# Patient Record
Sex: FEMALE | Race: BLACK OR AFRICAN AMERICAN | NOT HISPANIC OR LATINO
[De-identification: names, ages, dates, MRNs, and addresses within clinical notes are randomized per-mention and may not be internally consistent; named-entity substitution may affect disease eponyms.]

---

## 2019-04-11 ENCOUNTER — TRANSCRIPTION ENCOUNTER (OUTPATIENT)
Age: 69
End: 2019-04-11

## 2020-02-14 ENCOUNTER — TRANSCRIPTION ENCOUNTER (OUTPATIENT)
Age: 70
End: 2020-02-14

## 2020-10-27 ENCOUNTER — TRANSCRIPTION ENCOUNTER (OUTPATIENT)
Age: 70
End: 2020-10-27

## 2021-04-07 ENCOUNTER — APPOINTMENT (OUTPATIENT)
Dept: DISASTER EMERGENCY | Facility: OTHER | Age: 71
End: 2021-04-07

## 2021-12-22 ENCOUNTER — TRANSCRIPTION ENCOUNTER (OUTPATIENT)
Age: 71
End: 2021-12-22

## 2022-07-09 ENCOUNTER — NON-APPOINTMENT (OUTPATIENT)
Age: 72
End: 2022-07-09

## 2022-09-08 ENCOUNTER — APPOINTMENT (OUTPATIENT)
Dept: MRI IMAGING | Facility: CLINIC | Age: 72
End: 2022-09-08

## 2022-09-08 ENCOUNTER — RESULT REVIEW (OUTPATIENT)
Age: 72
End: 2022-09-08

## 2022-09-08 ENCOUNTER — APPOINTMENT (OUTPATIENT)
Dept: ORTHOPEDIC SURGERY | Facility: CLINIC | Age: 72
End: 2022-09-08

## 2022-09-08 VITALS — WEIGHT: 190 LBS | BODY MASS INDEX: 32.44 KG/M2 | HEIGHT: 64 IN

## 2022-09-08 DIAGNOSIS — I10 ESSENTIAL (PRIMARY) HYPERTENSION: ICD-10-CM

## 2022-09-08 DIAGNOSIS — G62.9 POLYNEUROPATHY, UNSPECIFIED: ICD-10-CM

## 2022-09-08 DIAGNOSIS — E11.9 TYPE 2 DIABETES MELLITUS W/OUT COMPLICATIONS: ICD-10-CM

## 2022-09-08 DIAGNOSIS — Z87.891 PERSONAL HISTORY OF NICOTINE DEPENDENCE: ICD-10-CM

## 2022-09-08 DIAGNOSIS — Z86.39 PERSONAL HISTORY OF OTHER ENDOCRINE, NUTRITIONAL AND METABOLIC DISEASE: ICD-10-CM

## 2022-09-08 PROBLEM — Z00.00 ENCOUNTER FOR PREVENTIVE HEALTH EXAMINATION: Status: ACTIVE | Noted: 2022-09-08

## 2022-09-08 PROCEDURE — 99203 OFFICE O/P NEW LOW 30 MIN: CPT | Mod: 57

## 2022-09-08 PROCEDURE — 73030 X-RAY EXAM OF SHOULDER: CPT | Mod: LT

## 2022-09-08 PROCEDURE — 73564 X-RAY EXAM KNEE 4 OR MORE: CPT | Mod: LT

## 2022-09-08 NOTE — ASSESSMENT
[FreeTextEntry1] : Underlying pathology reviewed and treatment options discussed. \par 09/08/2022 : xrays reveal 4 views left knee reveals suggestion of  mildly displace medial tibial plateua with underlying osteoarthritis. \par Obtain MRI left knee eval medial tibial plateau for fx\par Activity modifier as tolerated.\par Will attempt to dispense brace, is optional, resume cane for stability. \par Questions addressed.\par \par

## 2022-09-08 NOTE — REASON FOR VISIT
[FreeTextEntry2] : 09/08/2022 This is a  71 year female present for left knee and left arm , she fell @ [employer] 9/7/22\par

## 2022-09-08 NOTE — HISTORY OF PRESENT ILLNESS
[Sudden] : sudden [5] : 5 [2] : 2 [Burning] : burning [Dull/Aching] : dull/aching [Throbbing] : throbbing [Occasional] : occasional [Standing] : standing [Walking] : walking [Exercising] : exercising [Stairs] : stairs [de-identified] : 9/8/22: This is a 70 YO [place]. c/o of ecchymosis, hx of neuropathy. Also c/o of left shoulder pain.  [] : no [FreeTextEntry3] : 9/7/22

## 2022-09-08 NOTE — PHYSICAL EXAM
[Left] : left knee [] : patient ambulates with assistive device [TWNoteComboBox7] : flexion 120 degrees [de-identified] : extension 5 degrees

## 2022-09-08 NOTE — DISCUSSION/SUMMARY
[de-identified] : The documentation recorded by the scribe accurately reflects the service I personally performed and the decisions made by me.\par I, David Castro, attest that this documentation has been prepared under the direction and in the presence of Provider Samuel Paul MD.\par \par The patient was seen by Samuel Paul MD.\par The following radiographs were ordered and read by me during this patient's visit. I reviewed each radiograph in detail with the patient and parent, and discussed the findings as highlighted below.\par

## 2022-09-09 ENCOUNTER — FORM ENCOUNTER (OUTPATIENT)
Age: 72
End: 2022-09-09

## 2022-09-09 ENCOUNTER — TRANSCRIPTION ENCOUNTER (OUTPATIENT)
Age: 72
End: 2022-09-09

## 2022-09-10 ENCOUNTER — APPOINTMENT (OUTPATIENT)
Dept: MRI IMAGING | Facility: CLINIC | Age: 72
End: 2022-09-10

## 2022-09-10 PROCEDURE — 73721 MRI JNT OF LWR EXTRE W/O DYE: CPT | Mod: LT,MH

## 2022-09-15 ENCOUNTER — APPOINTMENT (OUTPATIENT)
Dept: ORTHOPEDIC SURGERY | Facility: CLINIC | Age: 72
End: 2022-09-15

## 2022-09-15 VITALS — WEIGHT: 190 LBS | HEIGHT: 64 IN | BODY MASS INDEX: 32.44 KG/M2

## 2022-09-15 DIAGNOSIS — S83.232A COMPLEX TEAR OF MEDIAL MENISCUS, CURRENT INJURY, LEFT KNEE, INITIAL ENCOUNTER: ICD-10-CM

## 2022-09-15 DIAGNOSIS — S82.132A DISPLACED FRACTURE OF MEDIAL CONDYLE OF LEFT TIBIA, INITIAL ENCOUNTER FOR CLOSED FRACTURE: ICD-10-CM

## 2022-09-15 PROCEDURE — 99213 OFFICE O/P EST LOW 20 MIN: CPT

## 2022-09-15 NOTE — ASSESSMENT
[FreeTextEntry1] : Underlying pathology reviewed and treatment options discussed. \par 09/08/2022 : xrays reveal 4 views left knee reveals suggestion of  mildly displace medial tibial plateua with underlying osteoarthritis. \par Obtain MRI left knee eval medial tibial plateau for fx\par Activity modifier as tolerated.\par Will attempt to dispense brace, is optional, resume cane for stability. \par Questions addressed.\par \par Stat doppler of the left lower extremity r/o DVT due to swelling, and severe tenderness.\par 09/15/2022: MRI reviewed and discussed.\par course outlined. \par If no improvement consider steroid injection. \par Monitor for mech. symptoms but here is sig Medial OA. \par Apply ice to affected area.\par Rx for knee high compression stockings provided. \par Questions addressed.\par

## 2022-09-15 NOTE — PHYSICAL EXAM
[Left] : left knee [] : patient ambulates with assistive device [TWNoteComboBox7] : flexion 120 degrees [de-identified] : extension 5 degrees

## 2022-09-15 NOTE — DISCUSSION/SUMMARY
[de-identified] : The documentation recorded by the scribe accurately reflects the service I personally performed and the decisions made by me.\par I, David Castro, attest that this documentation has been prepared under the direction and in the presence of Provider Samuel Paul MD.\par \par The patient was seen by Samuel Paul MD.\par \par

## 2022-09-15 NOTE — HISTORY OF PRESENT ILLNESS
[Sudden] : sudden [5] : 5 [2] : 2 [Burning] : burning [Dull/Aching] : dull/aching [Throbbing] : throbbing [Occasional] : occasional [Standing] : standing [Walking] : walking [Exercising] : exercising [Stairs] : stairs [de-identified] : 09/15/2022 Her efor MRI review. leg swelling and ecycymosis persist. \par \par 9/8/22: This is a 72 YO [place]. c/o of ecchymosis, hx of neuropathy. Also c/o of left shoulder pain.  [] : no [FreeTextEntry3] : 9/7/22

## 2022-09-15 NOTE — DATA REVIEWED
[Venous Doppler] : A Venous Doppler test was completed of the [Lower extremity] : lower extremity [Negative] : negative [MRI] : MRI [Left] : left [Knee] : knee [Report was reviewed and noted in the chart] : The report was reviewed and noted in the chart [I reviewed the films/CD and agree] : I reviewed the films/CD and agree [FreeTextEntry1] : Impression: left knee\par 1. No evidence of acute fracture with tricompartmental arthrosis most severe medially where there is complex medial meniscal tearing with subchondral edema and cysts in the medial tibial plateau.\par 2. Mild chronic ACL sprain, mild chronic MCL sprain with laxity, effusion, synovitis, popliteal cyst, and \par moderate diffuse anterior soft tissue swelling and bursitis which may be post-traumatic.\par 3. No evidence of lateral meniscal tear.\par Date of Dictation 09/11/2022/Electronically signed by Pool Harmon MD 09/12/2022 7:56:29 AM

## 2022-09-22 ENCOUNTER — APPOINTMENT (OUTPATIENT)
Dept: ORTHOPEDIC SURGERY | Facility: CLINIC | Age: 72
End: 2022-09-22

## 2022-09-22 VITALS — HEIGHT: 64 IN | WEIGHT: 190 LBS | BODY MASS INDEX: 32.44 KG/M2

## 2022-09-22 DIAGNOSIS — M79.89 OTHER SPECIFIED SOFT TISSUE DISORDERS: ICD-10-CM

## 2022-09-22 DIAGNOSIS — S40.012A CONTUSION OF LEFT SHOULDER, INITIAL ENCOUNTER: ICD-10-CM

## 2022-09-22 DIAGNOSIS — S40.019D: ICD-10-CM

## 2022-09-22 DIAGNOSIS — M17.12 UNILATERAL PRIMARY OSTEOARTHRITIS, LEFT KNEE: ICD-10-CM

## 2022-09-22 DIAGNOSIS — R93.7 ABNORMAL FINDINGS ON DIAGNOSTIC IMAGING OF OTHER PARTS OF MUSCULOSKELETAL SYSTEM: ICD-10-CM

## 2022-09-22 PROCEDURE — 99213 OFFICE O/P EST LOW 20 MIN: CPT

## 2022-09-22 NOTE — DISCUSSION/SUMMARY
[de-identified] : The documentation recorded by the scribe accurately reflects the service I personally performed and the decisions made by me.\par I, David Castro, attest that this documentation has been prepared under the direction and in the presence of Provider Samuel Paul MD.\par \par The patient was seen by Samuel Paul MD.\par

## 2022-09-22 NOTE — PHYSICAL EXAM
[Normal Coordination] : normal coordination [Normal Sensation] : normal sensation [Orientated] : orientated [Normal Skin] : normal skin [Left] : left knee [] : ambulation with cane

## 2022-09-22 NOTE — ASSESSMENT
[FreeTextEntry1] : Underlying pathology reviewed and treatment options discussed. \par 09/08/2022 : xrays reveal 4 views left knee reveals suggestion of  mildly displace medial tibial plateua with underlying osteoarthritis. \par Obtain MRI left knee eval medial tibial plateau for fx\par Activity modifier as tolerated.\par Will attempt to dispense brace, is optional, resume cane for stability. \par Questions addressed.\par \par Stat doppler of the left lower extremity r/o DVT due to swelling, and severe tenderness.\par 09/15/2022: MRI reviewed and discussed.\par course outlined. \par If no improvement consider steroid injection. \par Monitor for mech. symptoms but here is sig Medial OA. \par Apply ice to affected area.\par Rx for knee high compression stockings provided. \par Questions addressed.\par \par 09/22/2022: leg swelling persist. stairs are trouble some. \par Has PT planned for oct 4th.\par She has her compression stocking, and is using compression stocking at home. \par Apply ice as needed. left shoulder pain persist \par Questions addressed.\par

## 2022-09-22 NOTE — DATA REVIEWED
[Venous Doppler] : A Venous Doppler test was completed of the [Lower extremity] : lower extremity [Negative] : negative [MRI] : MRI [Left] : left [Knee] : knee [Report was reviewed and noted in the chart] : The report was reviewed and noted in the chart [I reviewed the films/CD and agree] : I reviewed the films/CD and agree [FreeTextEntry1] : Impression: left knee\par 1. No evidence of acute fracture with tricompartmental arthrosis most severe medially where there is complex medial meniscal tearing with subchondral edema and cysts in the medial tibial plateau.\par 2. Mild chronic ACL sprain, mild chronic MCL sprain with laxity, effusion, synovitis, popliteal cyst, and \par moderate diffuse anterior soft tissue swelling and bursitis which may be post-traumatic.\par 3. No evidence of lateral meniscal tear.\par Date of Dictation 09/11/2022/Electronically signed by Pool Harmon MD 09/12/2022

## 2022-09-22 NOTE — HISTORY OF PRESENT ILLNESS
[Sudden] : sudden [4] : 4 [2] : 2 [Burning] : burning [Dull/Aching] : dull/aching [Throbbing] : throbbing [Occasional] : occasional [Standing] : standing [Walking] : walking [Exercising] : exercising [Stairs] : stairs [de-identified] : 09/15/2022 Her efor MRI review. leg swelling and ecycymosis persist. \par \par 9/8/22: This is a 70 YO [place]. c/o of ecchymosis, hx of neuropathy. Also c/o of left shoulder pain.  [] : Post Surgical Visit: no [FreeTextEntry3] : 9/7/22

## 2022-11-03 ENCOUNTER — APPOINTMENT (OUTPATIENT)
Dept: ORTHOPEDIC SURGERY | Facility: CLINIC | Age: 72
End: 2022-11-03

## 2022-11-03 PROCEDURE — 99213 OFFICE O/P EST LOW 20 MIN: CPT | Mod: 25

## 2022-11-03 PROCEDURE — 20610 DRAIN/INJ JOINT/BURSA W/O US: CPT | Mod: LT

## 2022-11-03 NOTE — HISTORY OF PRESENT ILLNESS
[Sudden] : sudden [4] : 4 [2] : 2 [Burning] : burning [Dull/Aching] : dull/aching [Throbbing] : throbbing [Occasional] : occasional [Standing] : standing [Walking] : walking [Exercising] : exercising [Stairs] : stairs [de-identified] : 09/15/2022 Her efor MRI review. leg swelling and ecycymosis persist. \par \par 9/8/22: This is a 72 YO [place]. c/o of ecchymosis, hx of neuropathy. Also c/o of left shoulder pain.  [] : Post Surgical Visit: no [FreeTextEntry3] : 9/7/22

## 2022-11-03 NOTE — PHYSICAL EXAM
[Normal Coordination] : normal coordination [Normal Sensation] : normal sensation [Orientated] : orientated [Normal Skin] : normal skin [Left] : left shoulder [Sitting] : sitting [Moderate] : moderate [5 ___] : forward flexion 5[unfilled]/5 [5___] : internal rotation 5[unfilled]/5 [] : no erythema [TWNoteComboBox7] : active forward flexion 160 degrees [de-identified] : active abduction 145 degrees [TWNoteComboBox6] : internal rotation 50 degrees [de-identified] : external rotation 60 degrees

## 2022-11-03 NOTE — ASSESSMENT
[FreeTextEntry1] : Case discussed.\par Cortisone injection tolerated well.\par Will get an MRI to r/o a RTC tear.\par Followup after MRI.\par Entered by GRETCHEN Mari acting as scribe.\par -\par The documentation recorded by the scribe accurately reflects the service I personally performed and the decisions made by me.\par

## 2022-11-03 NOTE — REASON FOR VISIT
[FreeTextEntry2] : Follow up for the left shoulder. She fell on 9/7/22. Was seen on the 8th. Has been taking Aleve without relief.

## 2022-11-03 NOTE — PROCEDURE
[Large Joint Injection] : Large joint injection [Left] : of the left [Subacromial Space] : subacromial space [Pain] : pain [Inflammation] : inflammation [Alcohol] : alcohol [Betadine] : betadine [Ethyl Chloride sprayed topically] : ethyl chloride sprayed topically [Sterile technique used] : sterile technique used [___ cc    6mg] :  Betamethasone (Celestone) ~Vcc of 6mg [___ cc    1%] : Lidocaine ~Vcc of 1%  [] : Patient tolerated procedure well [Call if redness, pain or fever occur] : call if redness, pain or fever occur [Apply ice for 15min out of every hour for the next 12-24 hours as tolerated] : apply ice for 15 minutes out of every hour for the next 12-24 hours as tolerated [Patient was advised to rest the joint(s) for ____ days] : patient was advised to rest the joint(s) for [unfilled] days [Previous OTC use and PT nontherapeutic] : patient has tried OTC's including aspirin, Ibuprofen, Aleve, etc or prescription NSAIDS, and/or exercises at home and/or physical therapy without satisfactory response [Patient had decreased mobility in the joint] : patient had decreased mobility in the joint [Risks, benefits, alternatives discussed / Verbal consent obtained] : the risks benefits, and alternatives have been discussed, and verbal consent was obtained

## 2022-12-08 ENCOUNTER — APPOINTMENT (OUTPATIENT)
Dept: ORTHOPEDIC SURGERY | Facility: CLINIC | Age: 72
End: 2022-12-08

## 2022-12-20 ENCOUNTER — APPOINTMENT (OUTPATIENT)
Dept: ORTHOPEDIC SURGERY | Facility: CLINIC | Age: 72
End: 2022-12-20

## 2022-12-20 VITALS — WEIGHT: 190 LBS | HEIGHT: 64 IN | BODY MASS INDEX: 32.44 KG/M2

## 2022-12-20 DIAGNOSIS — M75.52 BURSITIS OF LEFT SHOULDER: ICD-10-CM

## 2022-12-20 DIAGNOSIS — M75.42 IMPINGEMENT SYNDROME OF LEFT SHOULDER: ICD-10-CM

## 2022-12-20 PROCEDURE — 99214 OFFICE O/P EST MOD 30 MIN: CPT

## 2022-12-20 RX ORDER — METFORMIN HYDROCHLORIDE 500 MG/1
500 TABLET, COATED ORAL
Qty: 180 | Refills: 0 | Status: ACTIVE | COMMUNITY
Start: 2022-06-30

## 2022-12-20 RX ORDER — ALPRAZOLAM 0.5 MG/1
0.5 TABLET ORAL
Qty: 3 | Refills: 0 | Status: ACTIVE | COMMUNITY
Start: 2022-12-09

## 2022-12-20 RX ORDER — CIPROFLOXACIN HYDROCHLORIDE 500 MG/1
500 TABLET, FILM COATED ORAL
Qty: 6 | Refills: 0 | Status: ACTIVE | COMMUNITY
Start: 2022-09-12

## 2022-12-20 RX ORDER — CARVEDILOL 25 MG/1
25 TABLET, FILM COATED ORAL
Qty: 180 | Refills: 0 | Status: ACTIVE | COMMUNITY
Start: 2021-12-08

## 2022-12-20 RX ORDER — PREGABALIN 50 MG/1
50 CAPSULE ORAL
Qty: 30 | Refills: 0 | Status: ACTIVE | COMMUNITY
Start: 2022-08-09

## 2022-12-20 RX ORDER — METAXALONE 800 MG/1
800 TABLET ORAL
Qty: 30 | Refills: 0 | Status: COMPLETED | COMMUNITY
Start: 2022-12-20 | End: 2023-01-19

## 2022-12-20 RX ORDER — ROSUVASTATIN CALCIUM 5 MG/1
5 TABLET, FILM COATED ORAL
Qty: 16 | Refills: 0 | Status: ACTIVE | COMMUNITY
Start: 2022-12-18

## 2022-12-20 RX ORDER — ERYTHROMYCIN 5 MG/G
5 OINTMENT OPHTHALMIC
Qty: 4 | Refills: 0 | Status: ACTIVE | COMMUNITY
Start: 2022-08-09

## 2022-12-20 RX ORDER — FLUTICASONE PROPIONATE 50 UG/1
50 SPRAY, METERED NASAL
Qty: 48 | Refills: 0 | Status: ACTIVE | COMMUNITY
Start: 2022-06-27

## 2022-12-20 RX ORDER — ICOSAPENT ETHYL 1000 MG/1
1 CAPSULE ORAL
Qty: 120 | Refills: 0 | Status: ACTIVE | COMMUNITY
Start: 2022-12-09

## 2022-12-20 RX ORDER — LEVOTHYROXINE SODIUM 0.1 MG/1
100 TABLET ORAL
Qty: 90 | Refills: 0 | Status: ACTIVE | COMMUNITY
Start: 2022-11-21

## 2022-12-20 RX ORDER — MELOXICAM 15 MG/1
15 TABLET ORAL
Qty: 30 | Refills: 0 | Status: COMPLETED | COMMUNITY
Start: 2022-12-20 | End: 2023-01-19

## 2022-12-20 RX ORDER — EMPAGLIFLOZIN 10 MG/1
10 TABLET, FILM COATED ORAL
Qty: 30 | Refills: 0 | Status: ACTIVE | COMMUNITY
Start: 2022-12-09

## 2022-12-20 NOTE — HISTORY OF PRESENT ILLNESS
[Sudden] : sudden [4] : 4 [8] : 8 [Burning] : burning [Dull/Aching] : dull/aching [Throbbing] : throbbing [Occasional] : occasional [Sleep] : sleep [Nothing helps with pain getting better] : Nothing helps with pain getting better [Sitting] : sitting [Lying in bed] : lying in bed [de-identified] : 09/15/2022 Her efor MRI review. leg swelling and ecycymosis persist. \par \par 9/8/22: This is a 72 YO [place]. c/o of ecchymosis, hx of neuropathy. Also c/o of left shoulder pain.  [] : Post Surgical Visit: no [FreeTextEntry1] : left shoulder [FreeTextEntry3] : 9/7/22 [FreeTextEntry5] : Patient is here today for MRI results of left shoulder. Patient states that the pain has gotten worse since last visit. Patient felt no improvement from her CSI [de-identified] : MRI

## 2022-12-20 NOTE — DATA REVIEWED
[MRI] : MRI [Left] : left [Shoulder] : shoulder [Report was reviewed and noted in the chart] : The report was reviewed and noted in the chart [I reviewed the films/CD and agree] : I reviewed the films/CD and agree [FreeTextEntry1] : IMPRESSION:   left shoulder\par \par 1. Fraying and tear of the superior labrum. Biceps tenosynovitis. No discrete tear at the anchor.\par 2. Capsular thickening which can be seen with adhesive capsulitis..\par 3. AC joint arthrosis. Supraspinatus tendinopathy and fraying with low-grade 6 x 4 mm tear within the fraying at the anterior insertion. 3 mm traction cyst humeral head with no fracture\par Ruddy Estrada MD  - Electronically Signed: 12-

## 2022-12-20 NOTE — ASSESSMENT
[FreeTextEntry1] : Case discussed.\par Cortisone injection tolerated well.\par Will get an MRI to r/o a RTC tear.\par Followup after MRI.\par \par 12/20/2022 No relief from steroid injection. \par 12/20/2022: MRI reviewed and discussed. Inflammation appears to be affect the should the most. \par Start PT and HEP to improve mechanics and reduce pain.\par prescribed mobic to help reduce inflammation and skelaxin to help with pain at night. \par Questions addressed.\par Activity modifier as tolerated.\par rto 6 wks.

## 2022-12-20 NOTE — PHYSICAL EXAM
[Left] : left shoulder [Sitting] : sitting [Moderate] : moderate [5 ___] : forward flexion 5[unfilled]/5 [5___] : internal rotation 5[unfilled]/5 [] : motor and sensory intact distally [TWNoteComboBox7] : active forward flexion 160 degrees [de-identified] : active abduction 145 degrees [TWNoteComboBox6] : internal rotation 50 degrees [de-identified] : external rotation 60 degrees

## 2023-02-02 ENCOUNTER — APPOINTMENT (OUTPATIENT)
Dept: ORTHOPEDIC SURGERY | Facility: CLINIC | Age: 73
End: 2023-02-02

## 2023-04-27 ENCOUNTER — APPOINTMENT (OUTPATIENT)
Dept: ORTHOPEDIC SURGERY | Facility: CLINIC | Age: 73
End: 2023-04-27
Payer: MEDICARE

## 2023-04-27 PROCEDURE — 72040 X-RAY EXAM NECK SPINE 2-3 VW: CPT

## 2023-04-27 PROCEDURE — 99214 OFFICE O/P EST MOD 30 MIN: CPT

## 2023-04-27 RX ORDER — METHYLPREDNISOLONE 4 MG/1
4 TABLET ORAL
Qty: 1 | Refills: 0 | Status: COMPLETED | COMMUNITY
Start: 2023-04-27 | End: 2023-05-03

## 2023-04-27 NOTE — IMAGING
[de-identified] : Cervical Spine Exam: ROM is full and mild diminished as age appropriate. There is no pain of the extreme of any type of motion except for left paracervical pain with left cervical rotation. There are no right sided paracervical symptoms and full right rotation as well as Spurling's position produced no pain.\par \par Cervical Spine X-Rays: Straightening of lordosis. Advanced DDD from C3 through T1 with bridging bone at C5-6, C6-7 and C7-T1. Advanced facet arthrosis. No fractures or legions.

## 2023-04-27 NOTE — DATA REVIEWED
[MRI] : MRI [Left] : left [Shoulder] : shoulder [Report was reviewed and noted in the chart] : The report was reviewed and noted in the chart [I independently reviewed and interpreted images and report] : I independently reviewed and interpreted images and report [I reviewed the films/CD] : I reviewed the films/CD [FreeTextEntry1] : MRI taken at [facility] on 12/12/2022

## 2023-04-27 NOTE — DISCUSSION/SUMMARY
[Medication Risks Reviewed] : Medication risks reviewed [de-identified] : 1) I discussed the risks, benefits and alternatives of treatment options for the Cervical spine, including activity modification, rest, home exercise, oral antiinflammatory medication, physical therapy, MDP.\par 2) The patient may take OTC NSAIDs PRN for pain. \par 3) Pt will continue with activity modification and home exercise as tolerated.  The patient should avoid exercise and activity that aggravates pain. \par 4) ** Rx MDP to the patient.  I instructed the patient to take each row at once at the same time each day for a period of 6 days.  The patient understands that he/she should finish all of the medication in the blister pack.  The patient understands the risks, benefits and alternatives to MDP, and the risks of steroidal medication. \par 5) Upon completion  I recommend the patient take 1 tablet of OTC Tylenol ES (500mg) with 2 tablets of OTC Ibuprofen (400mg total).  This combination of medication may be taken 1BID PRN, preferred with food. \par 6) Discontinue physical therapy for the left shoulder. \par \par \par The patient will continue with conservative treatment as described above, and will F/U in 1 month.\par \par \par The patient was advised of the diagnosis.  The natural history of the pathology was explained in full to the patient in layman's terms, including but not limited to the risks, symptoms and available options for treatment.  We discussed the risks, benefits and alternatives of the treatment options and the advice I provided to the patient as listed above.  Pt was given the opportunity to ask questions, and all questions were answered.  The discussion was not limited to the above.\par \par Entered by Brigitte Gonzalez acting as scribe.

## 2023-05-23 ENCOUNTER — APPOINTMENT (OUTPATIENT)
Dept: ORTHOPEDIC SURGERY | Facility: CLINIC | Age: 73
End: 2023-05-23
Payer: MEDICARE

## 2023-05-23 DIAGNOSIS — M50.30 OTHER CERVICAL DISC DEGENERATION, UNSPECIFIED CERVICAL REGION: ICD-10-CM

## 2023-05-23 PROCEDURE — 99214 OFFICE O/P EST MOD 30 MIN: CPT

## 2023-05-23 NOTE — HISTORY OF PRESENT ILLNESS
[] : yes [de-identified] : 04/27/2023: Left shoulder\par Pt was previously seen by Dr. Paul, who was following her for a bursitis of the left shoulder and impingement syndrome. Pt is currently attending physical therapy for the left arm. While the massage in PT left her arm sore, she states that it helped her shoulder temporarily. She states that unless she positions her arm on a pillow at night, she cannot sleep. Pain can be relieved at night with placing the left forearm on the head.  She experiences nocturnal awakenings, and states that some nights nothing can help her pain- pain is worst at night. She states that the pain is starting to radiate into the neck. She previously received a SA injection which she states did not help, and felt nothing from the injection. She had an MRI taken of the left shoulder recently on 12/12/2022. With sustained sitting while watching tv, the pain can quickly appear down the left arm. \par \par **MRI of the Left shoulder taken at [facility] on 12/12/2022**\par IMPRESSION:\par 1. Fraying and tear of the superior labrum. Biceps tenosynovitis. No discrete tear at the anchor.\par 2. Capsular thickening which can be seen with adhesive capsulitis.\par 3. AC joint arthrosis. Supraspinatus tendinopathy and fraying with low-grade 6 x 4 mm tear within the fraying at the anterior insertion. 3 mm traction cyst humeral head with no fracture. \par \par 05/23/2023: Neck\par Pt reports that she is not feeling well since the last visit. She states that while she had a brief period of pain relief before it returned.

## 2023-05-23 NOTE — DATA REVIEWED
[MRI] : MRI [Left] : left [Shoulder] : shoulder [Report was reviewed and noted in the chart] : The report was reviewed and noted in the chart [I independently reviewed and interpreted images and report] : I independently reviewed and interpreted images and report [I reviewed the films/CD] : I reviewed the films/CD [FreeTextEntry1] : MRI taken at [facility] on 12/12/2022

## 2023-05-23 NOTE — IMAGING
[de-identified] : Cervical Spine Exam: There is some tenderness over the left SCM and close to the mastoid. ROM is right rotation is full, left rotation is two-thirds full with pain over the SCM. DTRs are biceps are trace equal, triceps are trace equal.  \par \par Cervical Spine X-Rays: Straightening of lordosis. Advanced DDD from C3 through T1 with bridging bone at C5-6, C6-7 and C7-T1. Advanced facet arthrosis. No fractures or legions.

## 2023-05-23 NOTE — DISCUSSION/SUMMARY
[Medication Risks Reviewed] : Medication risks reviewed [de-identified] : 1) I discussed the risks, benefits and alternatives of treatment options for the Cervical spine, including activity modification, rest, home exercise, oral antiinflammatory medication, physical therapy, MDP.\par 2) The patient may take OTC NSAIDs PRN for pain. \par 3) Pt will continue with activity modification and home exercise as tolerated.  The patient should avoid exercise and activity that aggravates pain. \par 4) I recommend the patient take 1 tablet of OTC Tylenol ES (500mg) with 2 tablets of OTC Ibuprofen (400mg total).  This combination of medication may be taken 1BID PRN, preferred with food. \par 5) ** Obtain MRI of the cervical spine to evaluate left upper extremity radiculopathy.\par \par The patient will continue with conservative treatment as described above, and will F/U after MRI.\par \par \par The patient was advised of the diagnosis.  The natural history of the pathology was explained in full to the patient in layman's terms, including but not limited to the risks, symptoms and available options for treatment.  We discussed the risks, benefits and alternatives of the treatment options and the advice I provided to the patient as listed above.  Pt was given the opportunity to ask questions, and all questions were answered.  The discussion was not limited to the above.\par \par Entered by Brigitte Gonzalez acting as scribe.

## 2023-06-01 ENCOUNTER — FORM ENCOUNTER (OUTPATIENT)
Age: 73
End: 2023-06-01

## 2023-06-02 ENCOUNTER — APPOINTMENT (OUTPATIENT)
Dept: MRI IMAGING | Facility: CLINIC | Age: 73
End: 2023-06-02
Payer: MEDICARE

## 2023-06-02 PROCEDURE — 72141 MRI NECK SPINE W/O DYE: CPT | Mod: MH

## 2023-06-05 ENCOUNTER — EMERGENCY (EMERGENCY)
Facility: HOSPITAL | Age: 73
LOS: 0 days | Discharge: HOME | End: 2023-06-05
Attending: EMERGENCY MEDICINE
Payer: MEDICARE

## 2023-06-05 VITALS
DIASTOLIC BLOOD PRESSURE: 103 MMHG | HEART RATE: 86 BPM | TEMPERATURE: 98 F | OXYGEN SATURATION: 96 % | RESPIRATION RATE: 18 BRPM | SYSTOLIC BLOOD PRESSURE: 167 MMHG

## 2023-06-05 VITALS
DIASTOLIC BLOOD PRESSURE: 87 MMHG | TEMPERATURE: 98 F | OXYGEN SATURATION: 98 % | RESPIRATION RATE: 18 BRPM | HEIGHT: 64 IN | WEIGHT: 190.04 LBS | HEART RATE: 85 BPM | SYSTOLIC BLOOD PRESSURE: 154 MMHG

## 2023-06-05 DIAGNOSIS — R10.30 LOWER ABDOMINAL PAIN, UNSPECIFIED: ICD-10-CM

## 2023-06-05 DIAGNOSIS — N73.9 FEMALE PELVIC INFLAMMATORY DISEASE, UNSPECIFIED: ICD-10-CM

## 2023-06-05 DIAGNOSIS — R10.2 PELVIC AND PERINEAL PAIN: ICD-10-CM

## 2023-06-05 LAB
ALBUMIN SERPL ELPH-MCNC: 3.4 G/DL — SIGNIFICANT CHANGE UP (ref 3.3–5)
ALP SERPL-CCNC: 67 U/L — SIGNIFICANT CHANGE UP (ref 40–120)
ALT FLD-CCNC: 22 U/L — SIGNIFICANT CHANGE UP (ref 12–78)
ANION GAP SERPL CALC-SCNC: 3 MMOL/L — LOW (ref 5–17)
APPEARANCE UR: CLEAR — SIGNIFICANT CHANGE UP
AST SERPL-CCNC: 16 U/L — SIGNIFICANT CHANGE UP (ref 15–37)
BACTERIA # UR AUTO: ABNORMAL
BASOPHILS # BLD AUTO: 0.03 K/UL — SIGNIFICANT CHANGE UP (ref 0–0.2)
BASOPHILS NFR BLD AUTO: 0.3 % — SIGNIFICANT CHANGE UP (ref 0–2)
BILIRUB SERPL-MCNC: 0.3 MG/DL — SIGNIFICANT CHANGE UP (ref 0.2–1.2)
BILIRUB UR-MCNC: NEGATIVE — SIGNIFICANT CHANGE UP
BUN SERPL-MCNC: 19 MG/DL — SIGNIFICANT CHANGE UP (ref 7–23)
CALCIUM SERPL-MCNC: 9.1 MG/DL — SIGNIFICANT CHANGE UP (ref 8.5–10.1)
CHLORIDE SERPL-SCNC: 108 MMOL/L — SIGNIFICANT CHANGE UP (ref 96–108)
CO2 SERPL-SCNC: 29 MMOL/L — SIGNIFICANT CHANGE UP (ref 22–31)
COLOR SPEC: YELLOW — SIGNIFICANT CHANGE UP
CREAT SERPL-MCNC: 0.95 MG/DL — SIGNIFICANT CHANGE UP (ref 0.5–1.3)
DIFF PNL FLD: ABNORMAL
EGFR: 63 ML/MIN/1.73M2 — SIGNIFICANT CHANGE UP
EOSINOPHIL # BLD AUTO: 0.16 K/UL — SIGNIFICANT CHANGE UP (ref 0–0.5)
EOSINOPHIL NFR BLD AUTO: 1.8 % — SIGNIFICANT CHANGE UP (ref 0–6)
EPI CELLS # UR: ABNORMAL
GLUCOSE SERPL-MCNC: 110 MG/DL — HIGH (ref 70–99)
GLUCOSE UR QL: 250 MG/DL
HCT VFR BLD CALC: 40 % — SIGNIFICANT CHANGE UP (ref 34.5–45)
HGB BLD-MCNC: 12.8 G/DL — SIGNIFICANT CHANGE UP (ref 11.5–15.5)
IMM GRANULOCYTES NFR BLD AUTO: 0.2 % — SIGNIFICANT CHANGE UP (ref 0–0.9)
KETONES UR-MCNC: NEGATIVE — SIGNIFICANT CHANGE UP
LEUKOCYTE ESTERASE UR-ACNC: ABNORMAL
LYMPHOCYTES # BLD AUTO: 2.2 K/UL — SIGNIFICANT CHANGE UP (ref 1–3.3)
LYMPHOCYTES # BLD AUTO: 25.2 % — SIGNIFICANT CHANGE UP (ref 13–44)
MCHC RBC-ENTMCNC: 29.1 PG — SIGNIFICANT CHANGE UP (ref 27–34)
MCHC RBC-ENTMCNC: 32 G/DL — SIGNIFICANT CHANGE UP (ref 32–36)
MCV RBC AUTO: 90.9 FL — SIGNIFICANT CHANGE UP (ref 80–100)
MONOCYTES # BLD AUTO: 1.05 K/UL — HIGH (ref 0–0.9)
MONOCYTES NFR BLD AUTO: 12 % — SIGNIFICANT CHANGE UP (ref 2–14)
NEUTROPHILS # BLD AUTO: 5.27 K/UL — SIGNIFICANT CHANGE UP (ref 1.8–7.4)
NEUTROPHILS NFR BLD AUTO: 60.5 % — SIGNIFICANT CHANGE UP (ref 43–77)
NITRITE UR-MCNC: NEGATIVE — SIGNIFICANT CHANGE UP
NRBC # BLD: 0 /100 WBCS — SIGNIFICANT CHANGE UP (ref 0–0)
PH UR: 5 — SIGNIFICANT CHANGE UP (ref 5–8)
PLATELET # BLD AUTO: 197 K/UL — SIGNIFICANT CHANGE UP (ref 150–400)
POTASSIUM SERPL-MCNC: 4.6 MMOL/L — SIGNIFICANT CHANGE UP (ref 3.5–5.3)
POTASSIUM SERPL-SCNC: 4.6 MMOL/L — SIGNIFICANT CHANGE UP (ref 3.5–5.3)
PROT SERPL-MCNC: 7.1 GM/DL — SIGNIFICANT CHANGE UP (ref 6–8.3)
PROT UR-MCNC: 30 MG/DL
RBC # BLD: 4.4 M/UL — SIGNIFICANT CHANGE UP (ref 3.8–5.2)
RBC # FLD: 14 % — SIGNIFICANT CHANGE UP (ref 10.3–14.5)
RBC CASTS # UR COMP ASSIST: SIGNIFICANT CHANGE UP /HPF (ref 0–4)
SODIUM SERPL-SCNC: 140 MMOL/L — SIGNIFICANT CHANGE UP (ref 135–145)
SP GR SPEC: 1.03 — HIGH (ref 1.01–1.02)
UROBILINOGEN FLD QL: 1 MG/DL
WBC # BLD: 8.73 K/UL — SIGNIFICANT CHANGE UP (ref 3.8–10.5)
WBC # FLD AUTO: 8.73 K/UL — SIGNIFICANT CHANGE UP (ref 3.8–10.5)
WBC UR QL: SIGNIFICANT CHANGE UP

## 2023-06-05 PROCEDURE — 99285 EMERGENCY DEPT VISIT HI MDM: CPT

## 2023-06-05 PROCEDURE — 72190 X-RAY EXAM OF PELVIS: CPT | Mod: 26

## 2023-06-05 PROCEDURE — 74177 CT ABD & PELVIS W/CONTRAST: CPT | Mod: 26,MA

## 2023-06-05 RX ORDER — KETOROLAC TROMETHAMINE 30 MG/ML
30 SYRINGE (ML) INJECTION ONCE
Refills: 0 | Status: DISCONTINUED | OUTPATIENT
Start: 2023-06-05 | End: 2023-06-05

## 2023-06-05 RX ADMIN — Medication 30 MILLIGRAM(S): at 18:53

## 2023-06-05 NOTE — ED ADULT NURSE NOTE - DRUG PRE-SCREENING (DAST -1)
There are no preventive care reminders to display for this patient.    Patient is up to date, no discussion needed.           Statement Selected

## 2023-06-05 NOTE — ED PROVIDER NOTE - CLINICAL SUMMARY MEDICAL DECISION MAKING FREE TEXT BOX
Pelvic pressure only with standing some urinary frequency.  Will check UA r/o UTI.  Will also check Xray r/o pelvic fracture Pelvic pressure only with standing some urinary frequency.  Will check UA r/o UTI.  Will also check Xray r/o pelvic fracture    Xray negative for fracture.  UA negative for UTI.  CT shows pelvic inflammation but not definite source identified.  CT results discussed with patient.  Patient given anti-inflammatory and instructed to follow-up with PMD and GYN.

## 2023-06-05 NOTE — ED PROVIDER NOTE - OBJECTIVE STATEMENT
This patient is a 72 year old woman who presents to the ER c/o pelvic and suprapubic pain  x 3 days.  She denies injury, fall or trauma.  The pain began suddenly upon standing and only occurs when she stands and ambulates.  The pain resolves when she sits or lays down.  She did notice increase urinary frequency yesterday but denies dysuria, hematuria, fever, vaginal bleeding or vaginal discharge.

## 2023-06-05 NOTE — ED ADULT NURSE NOTE - OBJECTIVE STATEMENT
Pt axox3 presents to the ED c/o pelvic pain that started this past Saturday. Pt states she was sitting down when she started to get up and started feeling sudden  sharp 8/10 pelvic pain/pressure as she was getting up and walking. Pt reports frequent urination than usual. States that pain is only aggravated when trying to stand up and walking, but relieved with laying down. Denies radiation. Denies hematuria, burning on urination, discharge, fever chills, n/v, cp ,sob. hx HTN, HLD, DM, hypothyroid, kidney stones/cyst, incontinence.

## 2023-06-05 NOTE — ED PROVIDER NOTE - PATIENT PORTAL LINK FT
You can access the FollowMyHealth Patient Portal offered by [facility] by registering at the following website: [email or web address]. By joining [email or web address]’s FollowMyHealth portal, you will also be able to view your health information using other applications (apps) compatible with our system.

## 2023-06-05 NOTE — ED ADULT NURSE NOTE - NS ED NURSE LEVEL OF CONSCIOUSNESS MENTAL STATUS
.Refill passed per [facility] protocol.       Refill Protocol Appointment Criteria  · Appointment scheduled in the past 6 months or in the next 3 months  Recent Outpatient Visits            4 months ago Essential hypertension    [facility], [place]
Awake/Alert

## 2023-06-05 NOTE — ED PROVIDER NOTE - CARE PROVIDER_API CALL
Ashutosh Clark  Obstetrics and Gynecology  [address]  [place] [address]  Phone: [phone]  Fax: [phone]  Follow Up Time:

## 2023-06-05 NOTE — ED PROVIDER NOTE - NSFOLLOWUPINSTRUCTIONS_ED_ALL_ED_FT
1) Take tylenol or motrin for pain  2) Follow up with your primary care doctor  3) Follow-up with your GYN  4) Return to the ER for worsening or concerning symptoms

## 2023-06-05 NOTE — ED ADULT NURSE NOTE - NSFALLUNIVINTERV_ED_ALL_ED
Bed/Stretcher in lowest position, wheels locked, appropriate side rails in place/Call bell, personal items and telephone in reach/Instruct patient to call for assistance before getting out of bed/chair/stretcher/Non-slip footwear applied when patient is off stretcher/[place] to call system/Physically safe environment - no spills, clutter or unnecessary equipment/Purposeful proactive rounding/Room/bathroom lighting operational, light cord in reach

## 2023-06-05 NOTE — ED ADULT TRIAGE NOTE - CHIEF COMPLAINT QUOTE
patient c/o of pelvic pain and frequent urination started 3 days ago , patient stated " I feel like something is dropping " denied dysuria , no blood in urine or stool

## 2023-06-06 LAB
CULTURE RESULTS: SIGNIFICANT CHANGE UP
SPECIMEN SOURCE: SIGNIFICANT CHANGE UP

## 2023-06-13 ENCOUNTER — APPOINTMENT (OUTPATIENT)
Dept: ORTHOPEDIC SURGERY | Facility: CLINIC | Age: 73
End: 2023-06-13
Payer: MEDICARE

## 2023-06-13 DIAGNOSIS — M54.12 RADICULOPATHY, CERVICAL REGION: ICD-10-CM

## 2023-06-13 DIAGNOSIS — M50.20 OTHER CERVICAL DISC DISPLACEMENT, UNSPECIFIED CERVICAL REGION: ICD-10-CM

## 2023-06-13 PROCEDURE — 99214 OFFICE O/P EST MOD 30 MIN: CPT

## 2023-06-13 NOTE — IMAGING
[de-identified] : Cervical Spine Exam: Left sided neck and and LUE radicular symptoms produced with cervical rotation and flexion.  There is some tenderness over the left SCM and close to the mastoid. ROM is right rotation is full, left rotation is two-thirds full with pain over the SCM. DTRs are biceps are trace equal, triceps are trace equal.  \par \par Cervical Spine X-Rays: Straightening of lordosis. Advanced DDD from C3 through T1 with bridging bone at C5-6, C6-7 and C7-T1. Advanced facet arthrosis. No fractures or legions.

## 2023-06-13 NOTE — DISCUSSION/SUMMARY
[Medication Risks Reviewed] : Medication risks reviewed [de-identified] : 72f with left cervical radiculopathy, complaints are consistent  with pain being of a C6 dermatome. Reviewed MRI results with the patient, with HNP at C5-6 with compression of the left C6 nerve root. \par 1) I discussed the risks, benefits and alternatives of treatment options for the Cervical spine, including activity modification, rest, home exercise, oral antiinflammatory medication, physical therapy, MDP.\par 2) Pt will continue with activity modification and home exercise as tolerated.  The patient should avoid exercise and activity that aggravates pain. \par 3) I recommend the patient take 1 tablet of OTC Tylenol ES (500mg) with 2 tablets of OTC Ibuprofen (400mg total).  This combination of medication may be taken 1BID PRN, preferred with food. \par 5) Discussed pain management consult to further discuss CLIVE \par \par The patient will continue with conservative treatment as described above, and will consult with pain management.\par \par The patient was advised of the diagnosis.  The natural history of the pathology was explained in full to the patient in layman's terms, including but not limited to the risks, symptoms and available options for treatment.  We discussed the risks, benefits and alternatives of the treatment options and the advice I provided to the patient as listed above.  Pt was given the opportunity to ask questions, and all questions were answered.  The discussion was not limited to the above.\par \par Entered by Armida Andrade acting as scribe.

## 2023-06-13 NOTE — HISTORY OF PRESENT ILLNESS
[de-identified] : 04/27/2023: Left shoulder\par Pt was previously seen by Dr. Paul, who was following her for a bursitis of the left shoulder and impingement syndrome. Pt is currently attending physical therapy for the left arm. While the massage in PT left her arm sore, she states that it helped her shoulder temporarily. She states that unless she positions her arm on a pillow at night, she cannot sleep. Pain can be relieved at night with placing the left forearm on the head.  She experiences nocturnal awakenings, and states that some nights nothing can help her pain- pain is worst at night. She states that the pain is starting to radiate into the neck. She previously received a SA injection which she states did not help, and felt nothing from the injection. She had an MRI taken of the left shoulder recently on 12/12/2022. With sustained sitting while watching tv, the pain can quickly appear down the left arm. \par \par **MRI of the Left shoulder taken at [facility] on 12/12/2022**\par IMPRESSION:\par 1. Fraying and tear of the superior labrum. Biceps tenosynovitis. No discrete tear at the anchor.\par 2. Capsular thickening which can be seen with adhesive capsulitis.\par 3. AC joint arthrosis. Supraspinatus tendinopathy and fraying with low-grade 6 x 4 mm tear within the fraying at the anterior insertion. 3 mm traction cyst humeral head with no fracture. \par \par 05/23/2023: Neck\par Pt reports that she is not feeling well since the last visit. She states that while she had a brief period of pain relief before it returned.\par \par 06/13/2023: \par *MRI Cervical Spine 06.02.23*\par 1. Multilevel cervical degenerative disc and facet disease.\par 2. Left-sided disc herniation and osteophyte at C5-6 with mild compression of the left C6 nerve root.\par 3. Disc herniations at C3-4, C4-5 and C6-7 without spinal cord or nerve root compression. [[email or web address]] : Pt states she is doing the same since her last visit.  PT states she has pain in her neck when she turns her head side to side.

## 2023-06-16 ENCOUNTER — NON-APPOINTMENT (OUTPATIENT)
Age: 73
End: 2023-06-16

## 2023-12-06 ENCOUNTER — NON-APPOINTMENT (OUTPATIENT)
Age: 73
End: 2023-12-06

## 2024-03-30 ENCOUNTER — NON-APPOINTMENT (OUTPATIENT)
Age: 74
End: 2024-03-30

## 2024-10-02 ENCOUNTER — APPOINTMENT (OUTPATIENT)
Dept: ORTHOPEDIC SURGERY | Facility: CLINIC | Age: 74
End: 2024-10-02
Payer: MEDICARE

## 2024-10-02 DIAGNOSIS — M54.16 RADICULOPATHY, LUMBAR REGION: ICD-10-CM

## 2024-10-02 DIAGNOSIS — Z00.00 ENCOUNTER FOR GENERAL ADULT MEDICAL EXAMINATION W/OUT ABNORMAL FINDINGS: ICD-10-CM

## 2024-10-02 PROCEDURE — 99214 OFFICE O/P EST MOD 30 MIN: CPT

## 2024-10-02 PROCEDURE — 72170 X-RAY EXAM OF PELVIS: CPT

## 2024-10-02 PROCEDURE — 72110 X-RAY EXAM L-2 SPINE 4/>VWS: CPT

## 2024-10-02 RX ORDER — ALPRAZOLAM 0.5 MG/1
0.5 TABLET ORAL
Qty: 1 | Refills: 0 | Status: ACTIVE | COMMUNITY
Start: 2024-10-02 | End: 1900-01-01

## 2024-11-22 ENCOUNTER — APPOINTMENT (OUTPATIENT)
Dept: ORTHOPEDIC SURGERY | Facility: CLINIC | Age: 74
End: 2024-11-22

## 2024-12-06 ENCOUNTER — APPOINTMENT (OUTPATIENT)
Dept: ORTHOPEDIC SURGERY | Facility: CLINIC | Age: 74
End: 2024-12-06

## 2025-05-06 ENCOUNTER — APPOINTMENT (OUTPATIENT)
Dept: ORTHOPEDIC SURGERY | Facility: CLINIC | Age: 75
End: 2025-05-06
Payer: MEDICARE

## 2025-05-06 VITALS — BODY MASS INDEX: 32.44 KG/M2 | WEIGHT: 190 LBS | HEIGHT: 64 IN

## 2025-05-06 DIAGNOSIS — M65.342 TRIGGER FINGER, LEFT RING FINGER: ICD-10-CM

## 2025-05-06 PROCEDURE — 99213 OFFICE O/P EST LOW 20 MIN: CPT | Mod: 25

## 2025-05-06 PROCEDURE — 20550 NJX 1 TENDON SHEATH/LIGAMENT: CPT | Mod: F3

## 2025-05-06 PROCEDURE — J3490M: CUSTOM | Mod: NC
